# Patient Record
Sex: FEMALE | Race: WHITE | HISPANIC OR LATINO
[De-identification: names, ages, dates, MRNs, and addresses within clinical notes are randomized per-mention and may not be internally consistent; named-entity substitution may affect disease eponyms.]

---

## 2024-02-12 PROBLEM — Z00.00 ENCOUNTER FOR PREVENTIVE HEALTH EXAMINATION: Status: ACTIVE | Noted: 2024-02-12

## 2024-04-08 ENCOUNTER — APPOINTMENT (OUTPATIENT)
Dept: UROGYNECOLOGY | Facility: CLINIC | Age: 64
End: 2024-04-08
Payer: COMMERCIAL

## 2024-04-08 DIAGNOSIS — N81.6 RECTOCELE: ICD-10-CM

## 2024-04-08 DIAGNOSIS — N81.2 INCOMPLETE UTEROVAGINAL PROLAPSE: ICD-10-CM

## 2024-04-08 LAB
BILIRUB UR QL STRIP: NORMAL
CLARITY UR: CLEAR
COLLECTION METHOD: NORMAL
GLUCOSE UR-MCNC: NORMAL
HCG UR QL: 0.2 EU/DL
HGB UR QL STRIP.AUTO: NORMAL
KETONES UR-MCNC: NORMAL
LEUKOCYTE ESTERASE UR QL STRIP: NORMAL
NITRITE UR QL STRIP: NORMAL
PH UR STRIP: 6.5
PROT UR STRIP-MCNC: NORMAL
SP GR UR STRIP: <1.005

## 2024-04-08 PROCEDURE — 99204 OFFICE O/P NEW MOD 45 MIN: CPT | Mod: 25

## 2024-04-08 PROCEDURE — 51701 INSERT BLADDER CATHETER: CPT

## 2024-04-08 NOTE — PHYSICAL EXAM
[Chaperone Present] : A chaperone was present in the examining room during all aspects of the physical examination [Labia Majora] : were normal [Normal Appearance] : general appearance was normal [Atrophy] : atrophy [No Bleeding] : there was no active vaginal bleeding [Aa ____] : Aa [unfilled] [Ba ____] : Ba [unfilled] [C ____] : C [unfilled] [GH ____] : GH [unfilled] [PB ____] : PB [unfilled] [TVL ____] : TVL  [unfilled] [Ap ____] : Ap [unfilled] [Bp ____] : Bp [unfilled] [D ____] : D [unfilled] [Normal] : no abnormalities [Normal rectal exam] : was normal [FreeTextEntry1] : General: Well, appearing. Alert and orientated. No acute distress HEENT: Normocephalic, atraumatic and extraocular muscles appear to be intact Neck: Full range of motion, no obvious lymphadenopathy, deformities, or masses noted Respiratory: Speaking in full sentences comfortably, normal work of breathing and no cough during visit Musculoskeletal: active full range of motion in extremities Extremities: No upper extremity edema noted Skin: no obvious rash or skin lesions Neuro: Orientated X 3, speech is fluent, normal rate Psych: Normal mood and affect. [Tenderness] : ~T no ~M abdominal tenderness observed [Distended] : not distended

## 2024-04-08 NOTE — REASON FOR VISIT
[Questionnaire Received] : Patient questionnaire received [Intake Form Reviewed] : Patient intake form with past medical history, surgical history, family history and social history reviewed today [Pelvic Organ Prolapse] : pelvic organ prolapse [Urinary Incontinence] : urinary incontinence

## 2024-04-08 NOTE — HISTORY OF PRESENT ILLNESS
[Uterine Prolapse] : mild [Rectal Prolapse] : moderate [Unable To Restrain Bowel Movement] : mild [Urinary Tract Infection] : mild [Constipation Obstructed Defecation] : no [Stool Visible Blood] : no [Incomplete Emptying Of Stool] : no [] : no [de-identified] : sexually active [FreeTextEntry1] :  PMH: genital herpes PSH: h/o sling ~7 years ago, cholecystectomy  on osphina for atrophy

## 2024-04-08 NOTE — DISCUSSION/SUMMARY
[FreeTextEntry1] :  We reviewed management options for her prolapse including: observation, pelvic floor exercises with and without PT, pessary, and surgical management. We reviewed various surgical management options including vaginal, laparoscopic/robotic, abdominal procedures. We reviewed procedures involving hysterectomy as well as uterine sparing procedures. We also reviewed both mesh and non-mesh options. Written IUGA information on prolapse treatment options was also provided to her to review.   She would like to try a pessary and will RTO for pessary fitting. IUGA information on pessaries provided. We discussed her urinary symptoms. She will try a regular pessary first. If her ISABELLE worsens, will refit with pessary with continence support.

## 2024-04-29 ENCOUNTER — TRANSCRIPTION ENCOUNTER (OUTPATIENT)
Age: 64
End: 2024-04-29

## 2024-04-29 ENCOUNTER — APPOINTMENT (OUTPATIENT)
Dept: UROGYNECOLOGY | Facility: CLINIC | Age: 64
End: 2024-04-29
Payer: COMMERCIAL

## 2024-04-29 DIAGNOSIS — N39.46 MIXED INCONTINENCE: ICD-10-CM

## 2024-04-29 PROCEDURE — A4562: CPT

## 2024-04-29 PROCEDURE — 57160 INSERT PESSARY/OTHER DEVICE: CPT

## 2024-04-29 RX ORDER — ESTRADIOL 0.1 MG/G
0.1 CREAM VAGINAL
Qty: 1 | Refills: 5 | Status: ACTIVE | COMMUNITY
Start: 2024-04-29 | End: 1900-01-01

## 2024-04-29 NOTE — DISCUSSION/SUMMARY
[FreeTextEntry1] : RTO in 2 weeks or sooner as needed. - RS w/ knob #3 placed today -She was advised of possible worsening of urinary incontinence -She was advised to avoid constipation as pessary could fall out  -She was advised of possible increase in vaginal discharge and possibility of minimal vaginal bleeding -Precautions and instructions reviewed  #Vulvovaginal atrophy -We discussed risks, benefits and alternatives of vaginal estrogen cream  Rx for vaginal estrogen given; instructions reviewed.  #ISABELLE -She is interested in pelvic floor therapy and will be referred  -She will f/u for pessary teaching in 2 weeks

## 2024-04-29 NOTE — HISTORY OF PRESENT ILLNESS
[FreeTextEntry1] : Caterina is a 62y/o female with h/o mild ISABELLE and POP who presents for IPE.  She is sexually active and would like to perform self-care.

## 2024-04-29 NOTE — PHYSICAL EXAM
[Chaperone Present] : A chaperone was present in the examining room during all aspects of the physical examination [67662] : A chaperone was present during the pelvic exam. [No Acute Distress] : in no acute distress [Well developed] : well developed [Well Nourished] : ~L well nourished [Labia Majora] : were normal [Labia Minora] : were normal [Normal] : was normal [Normal Appearance] : general appearance was normal [Atrophy] : atrophy [FreeTextEntry2] : Rufus

## 2024-04-29 NOTE — PROCEDURE
[Good Fit] : fits well [Pessary Inserted] : inserted [None] : no bleeding [Medication Review] : Medicaiton Review: Patient verbalizes understanding of risks and benefits [Fluid Management] : Fluid Management: patient verbalizes understanding 6-10 cups per day [Bowel Management] : Bowel Management: patient verbalizes understanding of daily dietary fiber intake [Bladder Training] : Bladder Training: Patient given information with verbal understanding [Refit] : refit is not needed [Erosion] : no evidence of erosion [Erythema] : no erythema [Discharge] : no vaginal discharge [Infection] : no evidence of infection [FreeTextEntry1] : RS#5 was uncomfortable, RS#4 placed and was comfortable with valsalva and ambulation.  Pessary switched to RS w/ knob #3 due to ISABELLE which was comfortable

## 2024-05-31 ENCOUNTER — APPOINTMENT (OUTPATIENT)
Dept: UROGYNECOLOGY | Facility: CLINIC | Age: 64
End: 2024-05-31
Payer: COMMERCIAL

## 2024-05-31 DIAGNOSIS — N81.11 CYSTOCELE, MIDLINE: ICD-10-CM

## 2024-05-31 PROCEDURE — A4562: CPT

## 2024-05-31 PROCEDURE — 99213 OFFICE O/P EST LOW 20 MIN: CPT

## 2024-05-31 PROCEDURE — G2211 COMPLEX E/M VISIT ADD ON: CPT

## 2024-05-31 NOTE — PROCEDURE
[Good Fit] : fits well [Pessary Inserted] : inserted [None] : no bleeding [Medication Review] : Medicaiton Review: Patient verbalizes understanding of risks and benefits [Fluid Management] : Fluid Management: patient verbalizes understanding 6-10 cups per day [Bowel Management] : Bowel Management: patient verbalizes understanding of daily dietary fiber intake [Bladder Training] : Bladder Training: Patient given information with verbal understanding [Refit] : refit needed [Erosion] : no evidence of erosion [Erythema] : no erythema [Discharge] : no vaginal discharge [Infection] : no evidence of infection [FreeTextEntry1] : RS#3 [de-identified] : switched to RS#3 as she did not see noticeable benefit of knob pessary [FreeTextEntry8] : she demonstrated pessary self-care

## 2024-05-31 NOTE — PHYSICAL EXAM
[No Acute Distress] : in no acute distress [Well developed] : well developed [Well Nourished] : ~L well nourished [Labia Majora] : were normal [Labia Minora] : were normal [Normal] : was normal [Normal Appearance] : general appearance was normal [Atrophy] : atrophy [FreeTextEntry2] : Rufus

## 2024-05-31 NOTE — HISTORY OF PRESENT ILLNESS
[FreeTextEntry1] : Caterina is a 62y/o female with h/o mild ISABELLE and POP with recent placement of Ring with support #3.  She returns for pessary teaching.  She reports continued leakage of urine during walking or standing.  She denies leakage when she coughs, laughs or sneezes. She has not noticed a change in her ISABELLE with the knob pessary.  She denies pelvic pain, pressure or vaginal bleeding.

## 2024-05-31 NOTE — DISCUSSION/SUMMARY
[FreeTextEntry1] : #POP - RS#3 placed as she would like to perform self-care -Self-care demonstrated and she was advised to perform weekly -She is considering surgical management of her prolapse -She will undergo a UDS and f/u to [facility]   #Vulvovaginal atrophy -Continue vaginal estrogen cream  #ISABELLE -She is interested in pelvic floor therapy and will be referred

## 2024-07-25 ENCOUNTER — APPOINTMENT (OUTPATIENT)
Dept: UROGYNECOLOGY | Facility: CLINIC | Age: 64
End: 2024-07-25
Payer: COMMERCIAL

## 2024-07-25 ENCOUNTER — RESULT CHARGE (OUTPATIENT)
Age: 64
End: 2024-07-25

## 2024-07-25 VITALS — HEART RATE: 71 BPM | DIASTOLIC BLOOD PRESSURE: 72 MMHG | SYSTOLIC BLOOD PRESSURE: 135 MMHG

## 2024-07-25 DIAGNOSIS — N39.3 STRESS INCONTINENCE (FEMALE) (MALE): ICD-10-CM

## 2024-07-25 LAB
BILIRUB UR QL STRIP: NEGATIVE
CLARITY UR: CLEAR
COLLECTION METHOD: NORMAL
GLUCOSE UR-MCNC: NEGATIVE
HCG UR QL: 0.2 EU/DL
HGB UR QL STRIP.AUTO: NEGATIVE
KETONES UR-MCNC: NEGATIVE
LEUKOCYTE ESTERASE UR QL STRIP: NEGATIVE
NITRITE UR QL STRIP: NEGATIVE
PH UR STRIP: 7.5
PROT UR STRIP-MCNC: NEGATIVE
SP GR UR STRIP: 1.02

## 2024-07-25 PROCEDURE — 51797 INTRAABDOMINAL PRESSURE TEST: CPT | Mod: 26

## 2024-07-25 PROCEDURE — 51729 CYSTOMETROGRAM W/VP&UP: CPT | Mod: 26

## 2024-07-25 PROCEDURE — 81003 URINALYSIS AUTO W/O SCOPE: CPT | Mod: QW

## 2024-07-25 PROCEDURE — 51784 ANAL/URINARY MUSCLE STUDY: CPT | Mod: 26

## 2024-08-21 ENCOUNTER — APPOINTMENT (OUTPATIENT)
Dept: UROGYNECOLOGY | Facility: CLINIC | Age: 64
End: 2024-08-21
Payer: COMMERCIAL

## 2024-08-21 VITALS
BODY MASS INDEX: 25.8 KG/M2 | HEIGHT: 59 IN | WEIGHT: 128 LBS | DIASTOLIC BLOOD PRESSURE: 84 MMHG | SYSTOLIC BLOOD PRESSURE: 130 MMHG

## 2024-08-21 DIAGNOSIS — N81.2 INCOMPLETE UTEROVAGINAL PROLAPSE: ICD-10-CM

## 2024-08-21 DIAGNOSIS — N81.11 CYSTOCELE, MIDLINE: ICD-10-CM

## 2024-08-21 DIAGNOSIS — N39.46 MIXED INCONTINENCE: ICD-10-CM

## 2024-08-21 DIAGNOSIS — N81.6 RECTOCELE: ICD-10-CM

## 2024-08-21 PROCEDURE — 99214 OFFICE O/P EST MOD 30 MIN: CPT

## 2024-08-21 NOTE — DISCUSSION/SUMMARY
[FreeTextEntry1] : We reviewed management options for her prolapse including: observation, pelvic floor exercises with and without PT, pessary, and surgical management. We reviewed various surgical management options including vaginal, laparoscopic/robotic, abdominal procedures. We reviewed procedures involving hysterectomy as well as uterine sparing procedures. We also reviewed both mesh and non-mesh options. She desires surgery with a PHOENIX MUSTAFA. She also desires a concomitant sling for her ISABELLE. She will RTO for preop counseling.

## 2024-08-21 NOTE — HISTORY OF PRESENT ILLNESS
[Uterine Prolapse] : mild [Rectal Prolapse] : moderate [Unable To Restrain Bowel Movement] : mild [Urinary Tract Infection] : mild [Constipation Obstructed Defecation] : no [Stool Visible Blood] : no [Incomplete Emptying Of Stool] : no [] : no [de-identified] : sexually active [FreeTextEntry1] :  Caterina presents for follow up and discussion of urodynamic test results. She underwent urodynamics which revealed Stress urinary incontinence. We discussed that although her test was negative for detrusor overactivity, she may still have an overactive bladder. She has uterovaginal prolapse and desires surgery. She reports pessary is uncomfortable and is not controlling her ISABELLE well.   PMH: genital herpes PSH: h/o sling ~7 years ago, cholecystectomy  on osphina for atrophy

## 2024-08-28 ENCOUNTER — RESULT REVIEW (OUTPATIENT)
Age: 64
End: 2024-08-28

## 2024-08-28 ENCOUNTER — APPOINTMENT (OUTPATIENT)
Dept: ULTRASOUND IMAGING | Facility: CLINIC | Age: 64
End: 2024-08-28
Payer: COMMERCIAL

## 2024-08-28 PROCEDURE — 76830 TRANSVAGINAL US NON-OB: CPT

## 2024-08-28 PROCEDURE — 76856 US EXAM PELVIC COMPLETE: CPT

## 2024-08-30 ENCOUNTER — NON-APPOINTMENT (OUTPATIENT)
Age: 64
End: 2024-08-30

## 2024-10-11 ENCOUNTER — APPOINTMENT (OUTPATIENT)
Dept: UROGYNECOLOGY | Facility: CLINIC | Age: 64
End: 2024-10-11
Payer: COMMERCIAL

## 2024-10-11 VITALS
HEART RATE: 76 BPM | WEIGHT: 128 LBS | HEIGHT: 59 IN | BODY MASS INDEX: 25.8 KG/M2 | SYSTOLIC BLOOD PRESSURE: 130 MMHG | DIASTOLIC BLOOD PRESSURE: 80 MMHG

## 2024-10-11 DIAGNOSIS — N81.11 CYSTOCELE, MIDLINE: ICD-10-CM

## 2024-10-11 DIAGNOSIS — N39.3 STRESS INCONTINENCE (FEMALE) (MALE): ICD-10-CM

## 2024-10-11 DIAGNOSIS — N81.6 RECTOCELE: ICD-10-CM

## 2024-10-11 DIAGNOSIS — N81.2 INCOMPLETE UTEROVAGINAL PROLAPSE: ICD-10-CM

## 2024-10-11 PROCEDURE — 99214 OFFICE O/P EST MOD 30 MIN: CPT

## 2024-10-14 LAB — BACTERIA UR CULT: NORMAL

## 2024-10-18 ENCOUNTER — OUTPATIENT (OUTPATIENT)
Dept: OUTPATIENT SERVICES | Facility: HOSPITAL | Age: 64
LOS: 1 days | End: 2024-10-18
Payer: COMMERCIAL

## 2024-10-18 VITALS
WEIGHT: 130.07 LBS | SYSTOLIC BLOOD PRESSURE: 128 MMHG | TEMPERATURE: 98 F | HEART RATE: 70 BPM | RESPIRATION RATE: 16 BRPM | HEIGHT: 59 IN | OXYGEN SATURATION: 98 % | DIASTOLIC BLOOD PRESSURE: 77 MMHG

## 2024-10-18 DIAGNOSIS — N39.46 MIXED INCONTINENCE: ICD-10-CM

## 2024-10-18 DIAGNOSIS — N81.2 INCOMPLETE UTEROVAGINAL PROLAPSE: ICD-10-CM

## 2024-10-18 DIAGNOSIS — Z98.890 OTHER SPECIFIED POSTPROCEDURAL STATES: Chronic | ICD-10-CM

## 2024-10-18 DIAGNOSIS — N81.6 RECTOCELE: ICD-10-CM

## 2024-10-18 DIAGNOSIS — N81.11 CYSTOCELE, MIDLINE: ICD-10-CM

## 2024-10-18 DIAGNOSIS — Z01.818 ENCOUNTER FOR OTHER PREPROCEDURAL EXAMINATION: ICD-10-CM

## 2024-10-18 DIAGNOSIS — Z90.49 ACQUIRED ABSENCE OF OTHER SPECIFIED PARTS OF DIGESTIVE TRACT: Chronic | ICD-10-CM

## 2024-10-18 LAB
ANION GAP SERPL CALC-SCNC: 12 MMOL/L — SIGNIFICANT CHANGE UP (ref 5–17)
BLD GP AB SCN SERPL QL: NEGATIVE — SIGNIFICANT CHANGE UP
BUN SERPL-MCNC: 22 MG/DL — SIGNIFICANT CHANGE UP (ref 7–23)
CALCIUM SERPL-MCNC: 9.6 MG/DL — SIGNIFICANT CHANGE UP (ref 8.4–10.5)
CHLORIDE SERPL-SCNC: 105 MMOL/L — SIGNIFICANT CHANGE UP (ref 96–108)
CO2 SERPL-SCNC: 26 MMOL/L — SIGNIFICANT CHANGE UP (ref 22–31)
CREAT SERPL-MCNC: 0.54 MG/DL — SIGNIFICANT CHANGE UP (ref 0.5–1.3)
EGFR: 104 ML/MIN/1.73M2 — SIGNIFICANT CHANGE UP
GLUCOSE SERPL-MCNC: 112 MG/DL — HIGH (ref 70–99)
HCT VFR BLD CALC: 40.1 % — SIGNIFICANT CHANGE UP (ref 34.5–45)
HGB BLD-MCNC: 13.6 G/DL — SIGNIFICANT CHANGE UP (ref 11.5–15.5)
MCHC RBC-ENTMCNC: 31.9 PG — SIGNIFICANT CHANGE UP (ref 27–34)
MCHC RBC-ENTMCNC: 33.9 GM/DL — SIGNIFICANT CHANGE UP (ref 32–36)
MCV RBC AUTO: 93.9 FL — SIGNIFICANT CHANGE UP (ref 80–100)
NRBC # BLD: 0 /100 WBCS — SIGNIFICANT CHANGE UP (ref 0–0)
PLATELET # BLD AUTO: 203 K/UL — SIGNIFICANT CHANGE UP (ref 150–400)
POTASSIUM SERPL-MCNC: 4.1 MMOL/L — SIGNIFICANT CHANGE UP (ref 3.5–5.3)
POTASSIUM SERPL-SCNC: 4.1 MMOL/L — SIGNIFICANT CHANGE UP (ref 3.5–5.3)
RBC # BLD: 4.27 M/UL — SIGNIFICANT CHANGE UP (ref 3.8–5.2)
RBC # FLD: 12.7 % — SIGNIFICANT CHANGE UP (ref 10.3–14.5)
RH IG SCN BLD-IMP: POSITIVE — SIGNIFICANT CHANGE UP
SODIUM SERPL-SCNC: 143 MMOL/L — SIGNIFICANT CHANGE UP (ref 135–145)
WBC # BLD: 6.17 K/UL — SIGNIFICANT CHANGE UP (ref 3.8–10.5)
WBC # FLD AUTO: 6.17 K/UL — SIGNIFICANT CHANGE UP (ref 3.8–10.5)

## 2024-10-18 PROCEDURE — 86850 RBC ANTIBODY SCREEN: CPT

## 2024-10-18 PROCEDURE — 85027 COMPLETE CBC AUTOMATED: CPT

## 2024-10-18 PROCEDURE — 86901 BLOOD TYPING SEROLOGIC RH(D): CPT

## 2024-10-18 PROCEDURE — 83036 HEMOGLOBIN GLYCOSYLATED A1C: CPT

## 2024-10-18 PROCEDURE — 80048 BASIC METABOLIC PNL TOTAL CA: CPT

## 2024-10-18 PROCEDURE — 86900 BLOOD TYPING SEROLOGIC ABO: CPT

## 2024-10-18 PROCEDURE — G0463: CPT

## 2024-10-18 RX ORDER — LIDOCAINE HCL 60 MG/3 ML
0.2 SYRINGE (ML) INJECTION ONCE
Refills: 0 | Status: DISCONTINUED | OUTPATIENT
Start: 2024-11-08 | End: 2024-11-08

## 2024-10-18 RX ORDER — CEFAZOLIN SODIUM 1 G
2000 VIAL (EA) INJECTION ONCE
Refills: 0 | Status: COMPLETED | OUTPATIENT
Start: 2024-11-08 | End: 2024-11-08

## 2024-10-18 RX ORDER — SODIUM CHLORIDE 9 MG/ML
3 INJECTION, SOLUTION INTRAMUSCULAR; INTRAVENOUS; SUBCUTANEOUS EVERY 8 HOURS
Refills: 0 | Status: DISCONTINUED | OUTPATIENT
Start: 2024-11-08 | End: 2024-11-08

## 2024-10-18 RX ORDER — CHLORHEXIDINE GLUCONATE 40 MG/ML
1 SOLUTION TOPICAL ONCE
Refills: 0 | Status: DISCONTINUED | OUTPATIENT
Start: 2024-11-08 | End: 2024-11-08

## 2024-10-18 RX ORDER — VALACYCLOVIR 1000 MG/1
1 TABLET ORAL
Refills: 0 | DISCHARGE

## 2024-10-18 RX ORDER — CELECOXIB 100 MG
400 CAPSULE ORAL ONCE
Refills: 0 | Status: COMPLETED | OUTPATIENT
Start: 2024-11-08 | End: 2024-11-08

## 2024-10-18 RX ORDER — ACETAMINOPHEN 500 MG
1000 TABLET ORAL ONCE
Refills: 0 | Status: COMPLETED | OUTPATIENT
Start: 2024-11-08 | End: 2024-11-08

## 2024-10-18 RX ORDER — GABAPENTIN 300 MG/1
600 CAPSULE ORAL ONCE
Refills: 0 | Status: COMPLETED | OUTPATIENT
Start: 2024-11-08 | End: 2024-11-08

## 2024-10-18 NOTE — H&P PST ADULT - NSICDXPASTMEDICALHX_GEN_ALL_CORE_FT
PAST MEDICAL HISTORY:  2019 novel coronavirus disease (COVID-19)     Incomplete uterovaginal prolapse     OA (osteoarthritis)     Stress incontinence

## 2024-10-18 NOTE — H&P PST ADULT - NSANTHOSAYNRD_GEN_A_CORE
No. JUAN RAMON screening performed.  STOP BANG Legend: 0-2 = LOW Risk; 3-4 = INTERMEDIATE Risk; 5-8 = HIGH Risk

## 2024-10-18 NOTE — H&P PST ADULT - HISTORY OF PRESENT ILLNESS
63 y/o M  c/o stress inconstance urinary urgency and vaginal bulging for 5-6 month. She f/u with GYN and was diagnosed with incomplete uterovaginal prolapse. Today she presents to PST for scheduled Lap Robotic Supracervical Hysterectomy lap Robotic Sacral Colpopexy Midurethral Sling Cystoscopy on 24. Denies any palpitations, SOB, N/V, fever or chills.

## 2024-10-18 NOTE — H&P PST ADULT - NSICDXPASTSURGICALHX_GEN_ALL_CORE_FT
PAST SURGICAL HISTORY:  H/O midurethral sling procedure     History of laparoscopic cholecystectomy

## 2024-10-18 NOTE — H&P PST ADULT - ASSESSMENT
DASI: able to go up one flight of stairs or walk 1-2 blocks with out difficulty  Loose teeth: denies     CAPRINI SCORE    AGE RELATED RISK FACTORS                                                             [ ] Age 41-60 years                                            (1 Point)  [ x] Age: 61-74 years                                           (2 Points)                 [ ] Age= 75 years                                                (3 Points)             DISEASE RELATED RISK FACTORS                                                       [ ] Edema in the lower extremities                 (1 Point)                     [ ] Varicose veins                                               (1 Point)                                 [ ] BMI > 25 Kg/m2                                            (1 Point)                                  [ ] Serious infection (ie PNA)                            (1 Point)                     [ ] Lung disease ( COPD, Emphysema)            (1 Point)                                                                          [ ] Acute myocardial infarction                         (1 Point)                  [ ] Congestive heart failure (in the previous month)  (1 Point)         [ ] Inflammatory bowel disease                            (1 Point)                  [ ] Central venous access, PICC or Port               (2 points)       (within the last month)                                                                [ ] Stroke (in the previous month)                        (5 Points)    [ ] Previous or present malignancy                       (2 points)                                                                                                                                                         HEMATOLOGY RELATED FACTORS                                                         [ ] Prior episodes of VTE                                     (3 Points)                     [ ] Positive family history for VTE                      (3 Points)                  [ ] Prothrombin 72141 A                                     (3 Points)                     [ ] Factor V Leiden                                                (3 Points)                        [ ] Lupus anticoagulants                                      (3 Points)                                                           [ ] Anticardiolipin antibodies                              (3 Points)                                                       [ ] High homocysteine in the blood                   (3 Points)                                             [ ] Other congenital or acquired thrombophilia      (3 Points)                                                [ ] Heparin induced thrombocytopenia                  (3 Points)                                        MOBILITY RELATED FACTORS  [ ] Bed rest                                                         (1 Point)  [ ] Plaster cast                                                    (2 points)  [ ] Bed bound for more than 72 hours           (2 Points)    GENDER SPECIFIC FACTORS  [ ] Pregnancy or had a baby within the last month   (1 Point)  [ ] Post-partum < 6 weeks                                   (1 Point)  [ ] Hormonal therapy  or oral contraception   (1 Point)  [ ] History of pregnancy complications              (1 point)  [ ] Unexplained or recurrent              (1 Point)    OTHER RISK FACTORS                                           (1 Point)  [ ] BMI >40, smoking, diabetes requiring insulin, chemotherapy  blood transfusions and length of surgery over 2 hours    SURGERY RELATED RISK FACTORS  [ ]  Section within the last month     (1 Point)  [ ] Minor surgery                                                  (1 Point)  [ ] Arthroscopic surgery                                       (2 Points)  [x ] Planned major surgery lasting more            (2 Points)      than 45 minutes     [ ] Elective hip or knee joint replacement       (5 points)       surgery                                                TRAUMA RELATED RISK FACTORS  [ ] Fracture of the hip, pelvis, or leg                       (5 Points)  [ ] Spinal cord injury resulting in paralysis             (5 points)       (in the previous month)    [ ] Paralysis  (less than 1 month)                             (5 Points)  [ ] Multiple Trauma within 1 month                        (5 Points)    Total Score [   4     ]    Caprini Score 0-2: Low Risk, NO VTE prophylaxis required for most patients, encourage ambulation  Caprini Score 3-6: Moderate Risk , pharmacologic VTE prophylaxis is indicated for most patients (in the absence of contraindications)  Caprini Score Greater than or =7: High risk, pharmocologic VTE prophylaxis indicated for most patients (in the absence of contraindications)

## 2024-10-18 NOTE — H&P PST ADULT - PROBLEM SELECTOR PLAN 1
Lap Robotic Supracervical Hysterectomy lap Robotic Sacral Colpopexy Midurethral Sling Cystoscopy on 11/8/24  Pre-op education provided - all questions answered. Pt verbalized understanding

## 2024-10-19 LAB
A1C WITH ESTIMATED AVERAGE GLUCOSE RESULT: 5.8 % — HIGH (ref 4–5.6)
ESTIMATED AVERAGE GLUCOSE: 120 MG/DL — HIGH (ref 68–114)

## 2024-11-08 ENCOUNTER — OUTPATIENT (OUTPATIENT)
Dept: INPATIENT UNIT | Facility: HOSPITAL | Age: 64
LOS: 1 days | End: 2024-11-08
Payer: COMMERCIAL

## 2024-11-08 ENCOUNTER — APPOINTMENT (OUTPATIENT)
Dept: UROGYNECOLOGY | Facility: HOSPITAL | Age: 64
End: 2024-11-08
Payer: COMMERCIAL

## 2024-11-08 ENCOUNTER — RESULT REVIEW (OUTPATIENT)
Age: 64
End: 2024-11-08

## 2024-11-08 ENCOUNTER — TRANSCRIPTION ENCOUNTER (OUTPATIENT)
Age: 64
End: 2024-11-08

## 2024-11-08 VITALS
RESPIRATION RATE: 16 BRPM | TEMPERATURE: 98 F | WEIGHT: 130.07 LBS | HEIGHT: 59.02 IN | OXYGEN SATURATION: 96 % | DIASTOLIC BLOOD PRESSURE: 83 MMHG | SYSTOLIC BLOOD PRESSURE: 139 MMHG | HEART RATE: 77 BPM

## 2024-11-08 DIAGNOSIS — N39.46 MIXED INCONTINENCE: ICD-10-CM

## 2024-11-08 DIAGNOSIS — Z90.49 ACQUIRED ABSENCE OF OTHER SPECIFIED PARTS OF DIGESTIVE TRACT: Chronic | ICD-10-CM

## 2024-11-08 DIAGNOSIS — N81.11 CYSTOCELE, MIDLINE: ICD-10-CM

## 2024-11-08 DIAGNOSIS — Z98.890 OTHER SPECIFIED POSTPROCEDURAL STATES: Chronic | ICD-10-CM

## 2024-11-08 LAB
GLUCOSE BLDC GLUCOMTR-MCNC: 99 MG/DL — SIGNIFICANT CHANGE UP (ref 70–99)
HCT VFR BLD CALC: 39.1 % — SIGNIFICANT CHANGE UP (ref 34.5–45)
HGB BLD-MCNC: 13.6 G/DL — SIGNIFICANT CHANGE UP (ref 11.5–15.5)

## 2024-11-08 PROCEDURE — 57425 LAPAROSCOPY SURG COLPOPEXY: CPT

## 2024-11-08 PROCEDURE — 57288 REPAIR BLADDER DEFECT: CPT

## 2024-11-08 PROCEDURE — 88305 TISSUE EXAM BY PATHOLOGIST: CPT | Mod: 26

## 2024-11-08 PROCEDURE — 58542 LSH W/T/O UT 250 G OR LESS: CPT

## 2024-11-08 DEVICE — SYS SLING MID URET TRANSVAG: Type: IMPLANTABLE DEVICE | Status: FUNCTIONAL

## 2024-11-08 DEVICE — MESH UPSYLON Y: Type: IMPLANTABLE DEVICE | Status: FUNCTIONAL

## 2024-11-08 DEVICE — SURGIFLO MATRIX WITH THROMBIN KIT: Type: IMPLANTABLE DEVICE | Status: FUNCTIONAL

## 2024-11-08 RX ORDER — SIMETHICONE 80 MG/1
80 TABLET, CHEWABLE ORAL EVERY 6 HOURS
Refills: 0 | Status: DISCONTINUED | OUTPATIENT
Start: 2024-11-08 | End: 2024-11-09

## 2024-11-08 RX ORDER — ONDANSETRON HYDROCHLORIDE 2 MG/ML
4 INJECTION, SOLUTION INTRAMUSCULAR; INTRAVENOUS EVERY 6 HOURS
Refills: 0 | Status: DISCONTINUED | OUTPATIENT
Start: 2024-11-08 | End: 2024-11-09

## 2024-11-08 RX ORDER — POLYETHYLENE GLYCOL 3350 17 G/17G
17 POWDER, FOR SOLUTION ORAL DAILY
Refills: 0 | Status: DISCONTINUED | OUTPATIENT
Start: 2024-11-08 | End: 2024-11-09

## 2024-11-08 RX ORDER — OXYCODONE HYDROCHLORIDE 30 MG/1
5 TABLET ORAL EVERY 6 HOURS
Refills: 0 | Status: DISCONTINUED | OUTPATIENT
Start: 2024-11-08 | End: 2024-11-09

## 2024-11-08 RX ORDER — KETOROLAC TROMETHAMINE 30 MG/ML
30 INJECTION INTRAMUSCULAR; INTRAVENOUS EVERY 6 HOURS
Refills: 0 | Status: DISCONTINUED | OUTPATIENT
Start: 2024-11-08 | End: 2024-11-08

## 2024-11-08 RX ORDER — KETOROLAC TROMETHAMINE 30 MG/ML
15 INJECTION INTRAMUSCULAR; INTRAVENOUS EVERY 6 HOURS
Refills: 0 | Status: DISCONTINUED | OUTPATIENT
Start: 2024-11-08 | End: 2024-11-09

## 2024-11-08 RX ORDER — SENNA 187 MG
2 TABLET ORAL AT BEDTIME
Refills: 0 | Status: DISCONTINUED | OUTPATIENT
Start: 2024-11-08 | End: 2024-11-09

## 2024-11-08 RX ORDER — HYDROMORPHONE HCL/0.9% NACL/PF 6 MG/30 ML
0.5 PATIENT CONTROLLED ANALGESIA SYRINGE INTRAVENOUS
Refills: 0 | Status: DISCONTINUED | OUTPATIENT
Start: 2024-11-08 | End: 2024-11-08

## 2024-11-08 RX ORDER — ONDANSETRON HYDROCHLORIDE 2 MG/ML
4 INJECTION, SOLUTION INTRAMUSCULAR; INTRAVENOUS ONCE
Refills: 0 | Status: DISCONTINUED | OUTPATIENT
Start: 2024-11-08 | End: 2024-11-08

## 2024-11-08 RX ORDER — IBUPROFEN 200 MG
1 TABLET ORAL
Qty: 42 | Refills: 0
Start: 2024-11-08 | End: 2024-11-21

## 2024-11-08 RX ORDER — ACETAMINOPHEN 500 MG
1000 TABLET ORAL EVERY 6 HOURS
Refills: 0 | Status: COMPLETED | OUTPATIENT
Start: 2024-11-08 | End: 2024-11-09

## 2024-11-08 RX ADMIN — Medication 0.5 MILLIGRAM(S): at 13:05

## 2024-11-08 RX ADMIN — Medication 1000 MILLIGRAM(S): at 18:00

## 2024-11-08 RX ADMIN — SIMETHICONE 80 MILLIGRAM(S): 80 TABLET, CHEWABLE ORAL at 23:43

## 2024-11-08 RX ADMIN — KETOROLAC TROMETHAMINE 15 MILLIGRAM(S): 30 INJECTION INTRAMUSCULAR; INTRAVENOUS at 20:41

## 2024-11-08 RX ADMIN — Medication 2 TABLET(S): at 22:39

## 2024-11-08 RX ADMIN — SIMETHICONE 80 MILLIGRAM(S): 80 TABLET, CHEWABLE ORAL at 17:22

## 2024-11-08 RX ADMIN — SODIUM CHLORIDE 3 MILLILITER(S): 9 INJECTION, SOLUTION INTRAMUSCULAR; INTRAVENOUS; SUBCUTANEOUS at 06:45

## 2024-11-08 RX ADMIN — Medication 1000 MILLIGRAM(S): at 07:23

## 2024-11-08 RX ADMIN — KETOROLAC TROMETHAMINE 15 MILLIGRAM(S): 30 INJECTION INTRAMUSCULAR; INTRAVENOUS at 20:04

## 2024-11-08 RX ADMIN — Medication 400 MILLIGRAM(S): at 07:24

## 2024-11-08 RX ADMIN — Medication 0.5 MILLIGRAM(S): at 13:20

## 2024-11-08 RX ADMIN — GABAPENTIN 600 MILLIGRAM(S): 300 CAPSULE ORAL at 07:24

## 2024-11-08 RX ADMIN — Medication 400 MILLIGRAM(S): at 17:15

## 2024-11-08 RX ADMIN — Medication 400 MILLIGRAM(S): at 23:42

## 2024-11-08 NOTE — DISCHARGE NOTE PROVIDER - HOSPITAL COURSE
65 y/o s/p lsc RA KAI, BS, sacral colpoplexy, MUS, cysto.     Patient presented for scheduled procedure. She underwent uncomplicated  lsc RA KAI, BS, sacral colpoplexy, MUS, cysto. Please see operative note for full details. Patient was transferred to recovery room in stable condition.  EBL: 100. Hct:40.1    On POD #1, Carter catheter was discontinued and patient passed trial of void. Patient tolerated regular diet without issues.  On POD#1, pt was discharged in stable condition, ambulating, tolerating po and voiding spontaneously. Labs drawn post-operatively and on POD#1 were stable compared to pre-op.     Patient was instructed to follow up with Dr. Mosley in 2 weeks. 63 y/o s/p LSC Robot-Assisted Supracervical Hysterectomy, Bilateral Salpingectomy, sacral colpoplexy, MUS, cysto.     Patient presented for scheduled procedure. She underwent uncomplicated LSC Robot-Assisted Supracervical Hysterectomy, Bilateral Salpingectomy, sacral colpoplexy, MUS, cysto. Please see operative note for full details. Patient was transferred to recovery room in stable condition.  EBL: 100      Hct: 40.1->39.1->37.0    On POD #1, Carter catheter was discontinued and patient passed trial of void. Patient tolerated regular diet without issues. Patient was discharged in stable condition, ambulating, tolerating po and voiding spontaneously. Labs drawn post-operatively and on POD#1 were stable compared to pre-op.     Patient was instructed to follow up with Dr. Mosley in 2 weeks.

## 2024-11-08 NOTE — DISCHARGE NOTE PROVIDER - NSDCCPTREATMENT_GEN_ALL_CORE_FT
PRINCIPAL PROCEDURE  Procedure: Robot-assisted laparoscopic supracervical hysterectomy with cystoscopy  Findings and Treatment:       SECONDARY PROCEDURE  Procedure: Creation, midurethral sling, female  Findings and Treatment:     Procedure: Cystoscopy, female  Findings and Treatment:     Procedure: Robot-assisted sacrocolpopexy  Findings and Treatment:

## 2024-11-08 NOTE — DISCHARGE NOTE PROVIDER - NSDCFUADDINST_GEN_ALL_CORE_FT
Postoperative Instructions    Bowel regimen    To avoid constipation and straining, we suggest the following (simultaneously):  1. Colace (stool softener) 100mg, one pill three times a day (breakfast, lunch, dinner). If stool is too soft, decrease to twice a day (breakfast, dinner)  If still constipated, you can add: Miralax once at bedtime  All of these agents can be obtained over the counter at the pharmacy.      Pain control.    For pain control, take the followin.  Motrin 800mg three times a day, take with food. Prescription sent to pharmacy  2.  Add Tylenol as needed if still have pain despite Motrin  Motrin and Tylenol can be obtained over the counter.  3. Oxycodone 5 mg tablet every 6 hours as needed for severe pain. Prescription sent to pharmacy    Postoperative urinary catheter    If you failed your voiding trial in the hospital and are sent home with a catheter, please call the office ([phone]) so you can come in to have a repeat voiding trial/catheter removal in a few days.      Postoperative restrictions    Nothing in the vagina (tampons, sexual intercourse), No tub baths, pools or hot tubs for 6 weeks (showers are ok!)  No lifting anything heavier than 10 lbs, no strenuous exercise for 2 weeks after surgery. Do not pull or cut any stitches that you see around your incision.      Vaginal bleeding    Spotting and intermittent passage of blood clots per vagina is normal in first few weeks after surgery. If you are soaking 1 pad per hour, that is not normal and you should notify my office and seek medical attention right away.      Vaginal discharge    Vaginal discharge (all colors) is normal after vaginal surgery. If you’ve had vaginal surgery, you have sutures in your vagina which take 3 months to fully absorb. You may have vaginal discharge during this time. This is normal.        Postoperative Instructions    Bowel regimen    To avoid constipation and straining, we suggest the following (simultaneously):  1. Colace (stool softener) 100mg, one pill three times a day (breakfast, lunch, dinner). If stool is too soft, decrease to twice a day (breakfast, dinner)  If still constipated, you can add: Miralax once at bedtime  All of these agents can be obtained over the counter at the pharmacy.      Pain control.    For pain control, take the followin.  Motrin 800mg three times a day, take with food. Prescription sent to pharmacy  2.  Add Tylenol as needed if still have pain despite Motrin  Motrin and Tylenol can be obtained over the counter.  3. Oxycodone 5 mg tablet every 6 hours as needed for severe pain. Prescription sent to pharmacy    Postoperative urinary catheter      Postoperative restrictions    Nothing in the vagina (tampons, sexual intercourse), No tub baths, pools or hot tubs for 6 weeks (showers are ok!)  No lifting anything heavier than 10 lbs, no strenuous exercise for 2 weeks after surgery. Do not pull or cut any stitches that you see around your incision.      Vaginal bleeding    Spotting and intermittent passage of blood clots per vagina is normal in first few weeks after surgery. If you are soaking 1 pad per hour, that is not normal and you should notify my office and seek medical attention right away.      Vaginal discharge    Vaginal discharge (all colors) is normal after vaginal surgery. If you’ve had vaginal surgery, you have sutures in your vagina which take 3 months to fully absorb. You may have vaginal discharge during this time. This is normal.

## 2024-11-08 NOTE — BRIEF OPERATIVE NOTE - NSICDXBRIEFPREOP_GEN_ALL_CORE_FT
PRE-OP DIAGNOSIS:  Uterovaginal prolapse 08-Nov-2024 12:29:06  Rayna Colón  Stress incontinence 08-Nov-2024 12:29:13  Rayna Colón

## 2024-11-08 NOTE — DISCHARGE NOTE PROVIDER - CARE PROVIDER_API CALL
Anabelle Mosley  Urogyn and Reconst Pelvic Surg  [address], [address]  [place] [address]  Phone: [phone]  Fax: [phone]  Scheduled Appointment: 11/27/2024

## 2024-11-08 NOTE — BRIEF OPERATIVE NOTE - NSICDXBRIEFPROCEDURE_GEN_ALL_CORE_FT
PROCEDURES:  Robot-assisted laparoscopic supracervical hysterectomy with cystoscopy 08-Nov-2024 12:27:10  Rayna Colón  Bilateral salpingectomy 08-Nov-2024 12:27:23  Rayna Colón  Robot-assisted sacrocolpopexy 08-Nov-2024 12:28:18  Rayna Colón  Creation, midurethral sling, female 08-Nov-2024 12:28:28  Rayna Colón  Cystoscopy, female 08-Nov-2024 12:28:52  Rayna Colón

## 2024-11-08 NOTE — CHART NOTE - NSCHARTNOTEFT_GEN_A_CORE
PA POST-OP CHECK    No Known Allergies    S: Pt awake and alert resting comfortably in bed.  Pain controlled. Pt denies N/V, SOB, CP, palpitations.    O:   T(C): 36.7 (11-08-24 @ 16:00), Max: 36.7 (11-08-24 @ 16:00)  HR: 101 (11-08-24 @ 16:00) (75 - 101)  BP: 121/57 (11-08-24 @ 16:00) (108/53 - 122/58)  RR: 16 (11-08-24 @ 16:00) (16 - 16)  SpO2: 95% (11-08-24 @ 16:00) (92% - 100%)    I&O's Summary    08 Nov 2024 07:01  -  08 Nov 2024 16:30  --------------------------------------------------------  IN: 200 mL / OUT: 750 mL / NET: -550 mL                          13.6   x     )-----------( x        ( 08 Nov 2024 15:51 )             39.1     Complete Blood Count (10.18.24 @ 18:59)      WBC Count: 6.17 K/uL    RBC Count: 4.27 M/uL    Hemoglobin: 13.6 g/dL    Hematocrit: 40.1 %     Platelet Count - Automated: 203 K/uL    Heart: S1S2, RRR  Lungs: CTA B/L  Abd: soft, appropriately tender, occassional BS x 4 quadrants  Port Sites/Inc: Clean/dry/intact  Pelvic:  (-) bleeding  Ext: PAS in place, Neg Homans B/L    A/P: 64y Female S/P RA KAI, BS, MUS, Sacral Colpopexy, Cystoscopy in Stable condition  with PMHx of   PAST MEDICAL & SURGICAL HISTORY:  Incomplete uterovaginal prolapse  Stress incontinence  2019 novel coronavirus disease (COVID-19)  OA (osteoarthritis)  History of laparoscopic cholecystectomy  H/O midurethral sling procedure    -Continue post-op care  -Analgesia in PACU & prn  -OOB with assistance x 2, then Ad Georgiana  -Meds:   acetaminophen   IVPB .. 1000 milliGRAM(s) IV Intermittent every 6 hours  HYDROmorphone  Injectable 0.5 milliGRAM(s) IV Push every 10 minutes PRN  ketorolac   Injectable 15 milliGRAM(s) IV Push every 6 hours  ondansetron Injectable 4 milliGRAM(s) IV Push once PRN  ondansetron Injectable 4 milliGRAM(s) IV Push every 6 hours PRN  oxyCODONE    IR 5 milliGRAM(s) Oral every 6 hours PRN  senna 2 Tablet(s) Oral at bedtime  simethicone 80 milliGRAM(s) Chew every 6 hours    -IVFluids- LR   -Diet-  Advance as Tolerated to Reg  - Carter to gravity  with TOV in a.m.  -PAS   -Analgesia as above    LATRICE Alonso

## 2024-11-08 NOTE — DISCHARGE NOTE PROVIDER - NSDCFUSCHEDAPPT_GEN_ALL_CORE_FT
[facility] Physician [facility]  UROGYN [address]  Scheduled Appointment: 11/27/2024

## 2024-11-08 NOTE — BRIEF OPERATIVE NOTE - NSICDXBRIEFPOSTOP_GEN_ALL_CORE_FT
POST-OP DIAGNOSIS:  Uterovaginal prolapse 08-Nov-2024 12:29:21  Rayna Colón  Stress incontinence 08-Nov-2024 12:29:27  Rayna Colón

## 2024-11-08 NOTE — DISCHARGE NOTE PROVIDER - NSDCMRMEDTOKEN_GEN_ALL_CORE_FT
acetaminophen 325 mg oral tablet: 3 tab(s) orally every 6 hours as needed for  moderate pain  ibuprofen 800 mg oral tablet: 1 tab(s) orally every 8 hours  Magnesium 1 tab po daily:   Valtrex 500 mg oral tablet: 1 tab(s) orally once a day   acetaminophen 325 mg oral tablet: 3 tab(s) orally every 6 hours as needed for  moderate pain  ibuprofen 800 mg oral tablet: 1 tab(s) orally every 8 hours  Magnesium 1 tab po daily:   oxyCODONE 5 mg oral tablet: 1 tab(s) orally every 6 hours as needed for  severe pain MDD: 4 tabs  Valtrex 500 mg oral tablet: 1 tab(s) orally once a day

## 2024-11-09 ENCOUNTER — TRANSCRIPTION ENCOUNTER (OUTPATIENT)
Age: 64
End: 2024-11-09

## 2024-11-09 VITALS
SYSTOLIC BLOOD PRESSURE: 122 MMHG | TEMPERATURE: 98 F | RESPIRATION RATE: 18 BRPM | HEART RATE: 74 BPM | OXYGEN SATURATION: 96 % | DIASTOLIC BLOOD PRESSURE: 73 MMHG

## 2024-11-09 LAB
HCT VFR BLD CALC: 37 % — SIGNIFICANT CHANGE UP (ref 34.5–45)
HGB BLD-MCNC: 12.8 G/DL — SIGNIFICANT CHANGE UP (ref 11.5–15.5)
MCHC RBC-ENTMCNC: 32.4 PG — SIGNIFICANT CHANGE UP (ref 27–34)
MCHC RBC-ENTMCNC: 34.6 G/DL — SIGNIFICANT CHANGE UP (ref 32–36)
MCV RBC AUTO: 93.7 FL — SIGNIFICANT CHANGE UP (ref 80–100)
NRBC # BLD: 0 /100 WBCS — SIGNIFICANT CHANGE UP (ref 0–0)
PLATELET # BLD AUTO: 102 K/UL — LOW (ref 150–400)
RBC # BLD: 3.95 M/UL — SIGNIFICANT CHANGE UP (ref 3.8–5.2)
RBC # FLD: 12.4 % — SIGNIFICANT CHANGE UP (ref 10.3–14.5)
WBC # BLD: 9.42 K/UL — SIGNIFICANT CHANGE UP (ref 3.8–10.5)
WBC # FLD AUTO: 9.42 K/UL — SIGNIFICANT CHANGE UP (ref 3.8–10.5)

## 2024-11-09 PROCEDURE — S2900: CPT

## 2024-11-09 PROCEDURE — 88305 TISSUE EXAM BY PATHOLOGIST: CPT

## 2024-11-09 PROCEDURE — 82962 GLUCOSE BLOOD TEST: CPT

## 2024-11-09 PROCEDURE — 85018 HEMOGLOBIN: CPT

## 2024-11-09 PROCEDURE — 85027 COMPLETE CBC AUTOMATED: CPT

## 2024-11-09 PROCEDURE — 58542 LSH W/T/O UT 250 G OR LESS: CPT

## 2024-11-09 PROCEDURE — C9399: CPT

## 2024-11-09 PROCEDURE — 57283 COLPOPEXY INTRAPERITONEAL: CPT

## 2024-11-09 PROCEDURE — C1771: CPT

## 2024-11-09 PROCEDURE — C1781: CPT

## 2024-11-09 PROCEDURE — 86900 BLOOD TYPING SEROLOGIC ABO: CPT

## 2024-11-09 PROCEDURE — 57288 REPAIR BLADDER DEFECT: CPT

## 2024-11-09 PROCEDURE — 86901 BLOOD TYPING SEROLOGIC RH(D): CPT

## 2024-11-09 PROCEDURE — 86850 RBC ANTIBODY SCREEN: CPT

## 2024-11-09 PROCEDURE — 85014 HEMATOCRIT: CPT

## 2024-11-09 PROCEDURE — C1889: CPT

## 2024-11-09 RX ORDER — OXYCODONE HYDROCHLORIDE 30 MG/1
1 TABLET ORAL
Qty: 5 | Refills: 0
Start: 2024-11-09

## 2024-11-09 RX ORDER — NAPROXEN 250 MG/1
500 TABLET ORAL ONCE
Refills: 0 | Status: COMPLETED | OUTPATIENT
Start: 2024-11-09 | End: 2024-11-09

## 2024-11-09 RX ADMIN — NAPROXEN 500 MILLIGRAM(S): 250 TABLET ORAL at 12:54

## 2024-11-09 RX ADMIN — KETOROLAC TROMETHAMINE 15 MILLIGRAM(S): 30 INJECTION INTRAMUSCULAR; INTRAVENOUS at 02:35

## 2024-11-09 RX ADMIN — KETOROLAC TROMETHAMINE 15 MILLIGRAM(S): 30 INJECTION INTRAMUSCULAR; INTRAVENOUS at 02:04

## 2024-11-09 RX ADMIN — Medication 1000 MILLIGRAM(S): at 00:12

## 2024-11-09 RX ADMIN — SIMETHICONE 80 MILLIGRAM(S): 80 TABLET, CHEWABLE ORAL at 05:35

## 2024-11-09 RX ADMIN — Medication 400 MILLIGRAM(S): at 05:35

## 2024-11-09 RX ADMIN — Medication 1000 MILLIGRAM(S): at 06:10

## 2024-11-09 RX ADMIN — SIMETHICONE 80 MILLIGRAM(S): 80 TABLET, CHEWABLE ORAL at 12:54

## 2024-11-09 NOTE — DISCHARGE NOTE NURSING/CASE MANAGEMENT/SOCIAL WORK - NSDCPEFALRISK_GEN_ALL_CORE
For information on Fall & Injury Prevention, visit: [email or web address] OR  [email or web address] OR  https://www.cdc.gov/steadi/patient.html

## 2024-11-09 NOTE — PROGRESS NOTE ADULT - SUBJECTIVE AND OBJECTIVE BOX
Urogyn Progress Note     SUBJECTIVE:   Pt seen and examined at bedside. No events overnight. Pain well controlled. Patient tried standing overnight but felt weak. Not yet passing flatus. Tolerating regular diet. Pt denies fever, chills, chest pain, SOB, nausea, vomiting.     PHYSICAL EXAM:   T(F): 98.1 (11-09-24 @ 05:04), Max: 98.4 (11-09-24 @ 00:39)  HR: 68 (11-09-24 @ 05:04) (68 - 102)  BP: 104/65 (11-09-24 @ 05:04) (99/62 - 139/83)  RR: 18 (11-09-24 @ 05:04) (16 - 18)  SpO2: 96% (11-09-24 @ 05:04) (92% - 100%)  Wt(kg): --    Constitutional: NAD, A+O x3  CV: clinically well perfused  Lungs: unlabored breathing  Abdomen: Soft, nondistended, no guarding or rebound tenderness.  Incision: 5 LSC incision and 2 suprapubic incisions CDI with dermabond.   : Minimal bleeding on pad  Extremities: No lower extremity edema or calf tenderness bilaterally      LABS:  pending      CAPILLARY BLOOD GLUCOSE        I&O's Summary    08 Nov 2024 07:01  -  09 Nov 2024 06:29  --------------------------------------------------------  IN: 200 mL / OUT: 2950 mL / NET: -2750 mL        MEDICATIONS  (STANDING):  acetaminophen   IVPB .. 1000 milliGRAM(s) IV Intermittent every 6 hours  ketorolac   Injectable 15 milliGRAM(s) IV Push every 6 hours  lactated ringers. 1000 milliLiter(s) (75 mL/Hr) IV Continuous <Continuous>  polyethylene glycol 3350 17 Gram(s) Oral daily  senna 2 Tablet(s) Oral at bedtime  simethicone 80 milliGRAM(s) Chew every 6 hours    MEDICATIONS  (PRN):  ondansetron Injectable 4 milliGRAM(s) IV Push every 6 hours PRN Nausea and/or Vomiting  oxyCODONE    IR 5 milliGRAM(s) Oral every 6 hours PRN Severe Pain (7 - 10)   Urogyn Progress Note     SUBJECTIVE:   Pt seen and examined at bedside. No events overnight. Pain well controlled. Tolerating regular diet. Pt denies fever, chills, chest pain, SOB, nausea, vomiting.     PHYSICAL EXAM:   T(F): 98.1 (11-09-24 @ 05:04), Max: 98.4 (11-09-24 @ 00:39)  HR: 68 (11-09-24 @ 05:04) (68 - 102)  BP: 104/65 (11-09-24 @ 05:04) (99/62 - 139/83)  RR: 18 (11-09-24 @ 05:04) (16 - 18)  SpO2: 96% (11-09-24 @ 05:04) (92% - 100%)  Wt(kg): --    Constitutional: NAD, A+O x3  CV: clinically well perfused  Lungs: unlabored breathing  Abdomen: Soft, nondistended, no guarding or rebound tenderness.  Incision: 5 LSC incision and 2 suprapubic incisions CDI with dermabond.   : Minimal bleeding on pad  Extremities: No lower extremity edema or calf tenderness bilaterally      LABS:  pending      CAPILLARY BLOOD GLUCOSE        I&O's Summary    08 Nov 2024 07:01  -  09 Nov 2024 06:29  --------------------------------------------------------  IN: 200 mL / OUT: 2950 mL / NET: -2750 mL        MEDICATIONS  (STANDING):  acetaminophen   IVPB .. 1000 milliGRAM(s) IV Intermittent every 6 hours  ketorolac   Injectable 15 milliGRAM(s) IV Push every 6 hours  lactated ringers. 1000 milliLiter(s) (75 mL/Hr) IV Continuous <Continuous>  polyethylene glycol 3350 17 Gram(s) Oral daily  senna 2 Tablet(s) Oral at bedtime  simethicone 80 milliGRAM(s) Chew every 6 hours    MEDICATIONS  (PRN):  ondansetron Injectable 4 milliGRAM(s) IV Push every 6 hours PRN Nausea and/or Vomiting  oxyCODONE    IR 5 milliGRAM(s) Oral every 6 hours PRN Severe Pain (7 - 10)

## 2024-11-09 NOTE — CHART NOTE - NSCHARTNOTEFT_GEN_A_CORE
Urogynecology Chart Note - TOV      Pt eval at bedside for trial of void.  Pain well controlled.      Bladder fully drained through mayo catheter.  Retrograde filled with 300cc sterile water then clamped.    Mayo removed. Patient assisted to restroom.  Given necessary time to void (20 minutes).    Patient voided 300cc of yellow urine, passing TOV.    To be discharged home without mayo catheter.      SLIME Ricci PGY2  d/w Dr. Jacobo

## 2024-11-09 NOTE — DISCHARGE NOTE NURSING/CASE MANAGEMENT/SOCIAL WORK - PATIENT PORTAL LINK FT
You can access the FollowMyHealth Patient Portal offered by [facility] by registering at the following website: [email or web address]. By joining [employer]’s FollowMyHealth portal, you will also be able to view your health information using other applications (apps) compatible with our system.

## 2024-11-09 NOTE — DISCHARGE NOTE NURSING/CASE MANAGEMENT/SOCIAL WORK - FINANCIAL ASSISTANCE
[facility] provides services at a reduced cost to those who are determined to be eligible through [facility]’s financial assistance program. Information regarding [facility]’s financial assistance program can be found by going to [email or web address] or by calling [phone].

## 2024-11-09 NOTE — PROGRESS NOTE ADULT - ASSESSMENT
Assessment/Plan: 64y female POD#1 s/p Robot-Assisted Supracervical Hysterectomy, Bilateral Salpingectomy, SCP, MUS, cysto recovering well post-operatively.    Neuro: Continue IV Tylenol, Toradol, and oxy PRN for pain control  CV: Hemodynamically stable  - f/u AM CBC  Pulm: Saturating well on room air. Encourage ambulation.   GI: Continue regular diet.   : Carter in place. Adequate UOP. Plan for AM TOV  Heme: Continue Venodynes for DVT ppx. Increase OOB.    FEN: LR@75  ID: Afebrile  Endo: No active issues  Dispo: Continue inpatient management. Discharge planning today after TOV    Shruti Ricci, PGY2   Assessment/Plan: 64y female POD#1 s/p Robot-Assisted Supracervical Hysterectomy, Bilateral Salpingectomy, SCP, MUS, cysto recovering well post-operatively.    Neuro: Continue IV Tylenol, Toradol, and oxy PRN for pain control  CV: Hemodynamically stable  - f/u AM CBC  Pulm: Saturating well on room air. Encourage ambulation.   GI: Continue regular diet.   : Mayo in place. Adequate UOP. Plan for AM TOV  Heme: Continue Venodynes for DVT ppx. Increase OOB.    FEN: LR@75  ID: Afebrile  Endo: No active issues  Dispo: Continue inpatient management. Discharge planning today after TOV    Shruti Ricci, PGY2    --------------  Urogyn Fellow Addendum    Pt seen and examined.  Pain well controlled.  Tolerating PO intake without n/v.  Passing flatus.    T(C): 36.7 (11-09-24 @ 05:04), Max: 36.9 (11-09-24 @ 00:39)  HR: 68 (11-09-24 @ 05:04) (68 - 102)  BP: 104/65 (11-09-24 @ 05:04) (99/62 - 122/58)  RR: 18 (11-09-24 @ 05:04) (16 - 18)  SpO2: 96% (11-09-24 @ 05:04) (92% - 100%)    I&O's Summary    08 Nov 2024 07:01  -  09 Nov 2024 07:00  --------------------------------------------------------  IN: 200 mL / OUT: 2950 mL / NET: -2750 mL    Physical Exam  Gen: NAD  HEENT: NCAT, sclera anicteric  Resp: non-labored  Abd: soft, non-distended, non-tender; incisions clean, dry, intact  : mayo draining clear urine; pad with <5 cc blood                          12.8   9.42  )-----------( 102      ( 09 Nov 2024 06:29 )             37.0         64F POD#1 s/p RA KAI, SCP, BS, RP midurethral sling, cystoscopy, doing well.   -continue pain control  -regular diet  -dc IV fluids  -oob/ambulation  -dvt prophylaxis  -written postop instruction sheet provided to and discussed with patient  -void trial this am  -dispo: home    Sachi Jacobo MD  Urogynecology Fellow, PGY-8

## 2024-11-11 ENCOUNTER — NON-APPOINTMENT (OUTPATIENT)
Age: 64
End: 2024-11-11

## 2024-11-11 RX ORDER — ACETAMINOPHEN 500 MG
3 TABLET ORAL
Refills: 0 | DISCHARGE

## 2024-11-12 ENCOUNTER — TRANSCRIPTION ENCOUNTER (OUTPATIENT)
Age: 64
End: 2024-11-12

## 2024-11-14 LAB — SURGICAL PATHOLOGY STUDY: SIGNIFICANT CHANGE UP

## 2024-11-26 PROBLEM — Z98.890 POSTOPERATIVE STATE: Status: ACTIVE | Noted: 2024-11-26

## 2024-11-27 ENCOUNTER — APPOINTMENT (OUTPATIENT)
Dept: UROGYNECOLOGY | Facility: CLINIC | Age: 64
End: 2024-11-27

## 2024-11-27 VITALS
HEART RATE: 65 BPM | HEIGHT: 55 IN | WEIGHT: 128 LBS | BODY MASS INDEX: 29.62 KG/M2 | SYSTOLIC BLOOD PRESSURE: 124 MMHG | DIASTOLIC BLOOD PRESSURE: 76 MMHG

## 2024-11-27 DIAGNOSIS — Z98.890 OTHER SPECIFIED POSTPROCEDURAL STATES: ICD-10-CM

## 2024-11-27 PROBLEM — N81.2 INCOMPLETE UTEROVAGINAL PROLAPSE: Chronic | Status: ACTIVE | Noted: 2024-10-18

## 2024-11-27 PROBLEM — U07.1 COVID-19: Chronic | Status: ACTIVE | Noted: 2024-10-18

## 2024-11-27 PROBLEM — M19.90 UNSPECIFIED OSTEOARTHRITIS, UNSPECIFIED SITE: Chronic | Status: ACTIVE | Noted: 2024-10-18

## 2024-11-27 PROBLEM — N39.3 STRESS INCONTINENCE (FEMALE) (MALE): Chronic | Status: ACTIVE | Noted: 2024-10-18

## 2024-11-27 PROCEDURE — 99024 POSTOP FOLLOW-UP VISIT: CPT

## 2024-11-27 RX ORDER — IBUPROFEN 800 MG/1
800 TABLET, FILM COATED ORAL 4 TIMES DAILY
Qty: 120 | Refills: 0 | Status: ACTIVE | COMMUNITY
Start: 2024-11-27 | End: 1900-01-01

## 2024-11-29 ENCOUNTER — NON-APPOINTMENT (OUTPATIENT)
Age: 64
End: 2024-11-29

## 2024-11-29 LAB — BACTERIA UR CULT: NORMAL

## 2024-12-12 ENCOUNTER — NON-APPOINTMENT (OUTPATIENT)
Age: 64
End: 2024-12-12

## 2024-12-17 ENCOUNTER — APPOINTMENT (OUTPATIENT)
Dept: UROGYNECOLOGY | Facility: CLINIC | Age: 64
End: 2024-12-17
Payer: COMMERCIAL

## 2024-12-17 VITALS — SYSTOLIC BLOOD PRESSURE: 146 MMHG | HEART RATE: 70 BPM | DIASTOLIC BLOOD PRESSURE: 79 MMHG

## 2024-12-17 DIAGNOSIS — Z98.890 OTHER SPECIFIED POSTPROCEDURAL STATES: ICD-10-CM

## 2024-12-17 PROCEDURE — 99024 POSTOP FOLLOW-UP VISIT: CPT

## 2025-03-19 ENCOUNTER — APPOINTMENT (OUTPATIENT)
Dept: UROGYNECOLOGY | Facility: CLINIC | Age: 65
End: 2025-03-19
Payer: COMMERCIAL

## 2025-03-19 VITALS — HEART RATE: 68 BPM | SYSTOLIC BLOOD PRESSURE: 123 MMHG | DIASTOLIC BLOOD PRESSURE: 84 MMHG

## 2025-03-19 DIAGNOSIS — N95.2 POSTMENOPAUSAL ATROPHIC VAGINITIS: ICD-10-CM

## 2025-03-19 PROCEDURE — 99213 OFFICE O/P EST LOW 20 MIN: CPT

## (undated) DEVICE — TUBING TUR 2 PRONG

## (undated) DEVICE — GOWN TRIMAX LG

## (undated) DEVICE — TROCAR SURGIQUEST AIRSEAL 8MMX100MM

## (undated) DEVICE — FOLEY TRAY 16FR LF URINE METER SURESTEP

## (undated) DEVICE — LONE STAR RETRACTOR RING 32.5CM X 18.3CM DISP

## (undated) DEVICE — BLADE SCALPEL SAFETYLOCK #11

## (undated) DEVICE — NDL COUNTER FOAM AND MAGNET 40-70

## (undated) DEVICE — SUT GORETEX CV-2 (0) 36" PH-24

## (undated) DEVICE — XI TIP COVER

## (undated) DEVICE — DRSG DERMABOND 0.7ML

## (undated) DEVICE — XI ARM FORCEP MARYLAND BIPOLAR

## (undated) DEVICE — WARMING BLANKET UPPER ADULT

## (undated) DEVICE — POSITIONER PINK PAD PIGAZZI SYSTEM

## (undated) DEVICE — APPLICATOR ENDOSCOPIC FOR SUGIFLO

## (undated) DEVICE — MARKING PEN W RULER

## (undated) DEVICE — SUT VLOC 180 2-0 6" GS-22 GREEN

## (undated) DEVICE — INSUFFLATION NDL COVIDIEN STEP 14G FOR STEP/VERSASTEP

## (undated) DEVICE — SUT POLYSORB 0 30" GS-21 UNDYED

## (undated) DEVICE — ENDOCATCH 10MM SPECIMEN POUCH

## (undated) DEVICE — FOLEY CATH 2-WAY 18FR 5CC LATEX HYDROGEL

## (undated) DEVICE — SUT BIOSYN 4-0 18" P-12

## (undated) DEVICE — PREP CHLOROHEXIDINE 4% 118CC KIT

## (undated) DEVICE — SUT CLIP LAPRA-TY ABSORBABLE SIZE 0.118 TO 0.12" VIOLET

## (undated) DEVICE — TUBING STRYKEFLOW II SUCTION / IRRIGATOR

## (undated) DEVICE — XI ARM FORCEP TENACULUM

## (undated) DEVICE — XI OBTURATOR OPTICAL BLADELESS 8MM

## (undated) DEVICE — DRAPE MAYO STAND 30"

## (undated) DEVICE — XI DRAPE COLUMN

## (undated) DEVICE — SUT POLYSORB 3-0 30" V-20 UNDYED

## (undated) DEVICE — LAP PAD 4 X 18"

## (undated) DEVICE — DRAPE 3/4 SHEET W REINFORCEMENT 56X77"

## (undated) DEVICE — NDL HYPO SAFE 22G X 1.5" (BLACK)

## (undated) DEVICE — LUBRICANT INST ELECTROLUBE Z SOLUTION

## (undated) DEVICE — SPECIMEN CONTAINER 100ML

## (undated) DEVICE — XI DRAPE ARM

## (undated) DEVICE — SOL INJ NS 0.9% 1000ML

## (undated) DEVICE — STAPLER SKIN VISI-STAT 35 WIDE

## (undated) DEVICE — DRAPE GYN W LEGGINGS 3X125"

## (undated) DEVICE — VENODYNE/SCD SLEEVE CALF MEDIUM

## (undated) DEVICE — MEDICATION LABELS W MARKER

## (undated) DEVICE — TUBING AIRSEAL TRI-LUMEN FILTERED

## (undated) DEVICE — PREP CHLORAPREP HI-LITE ORANGE 26ML

## (undated) DEVICE — XI ARM FORCEP PROGRASP 8MM

## (undated) DEVICE — SYR LUER LOK 20CC

## (undated) DEVICE — XI ARM SCISSOR MONO CURVED

## (undated) DEVICE — XI SEAL UNIVERSIAL 5-12MM

## (undated) DEVICE — SOL IRR POUR H2O 250ML

## (undated) DEVICE — SOL IRR POUR NS 0.9% 500ML

## (undated) DEVICE — LONE STAR ELASTIC STAY HOOK 5MM SHARP

## (undated) DEVICE — SUT BIOSYN 2-0 27" V-20

## (undated) DEVICE — XI ARM GRASPER TIP UP FENESTRATED

## (undated) DEVICE — SOL INJ NS 0.9% 100ML

## (undated) DEVICE — D HELP - CLEARVIEW CLEARIFY SYSTEM

## (undated) DEVICE — ELCTR BOVIE PENCIL SMOKE EVACUATION

## (undated) DEVICE — TUBING SUCTION 20FT

## (undated) DEVICE — PACK GYN LAPAROSCOPY

## (undated) DEVICE — SHEARS COVIDIEN ENDO SHEAR 5MM X 31CM W UNIPOLAR CAUTERY

## (undated) DEVICE — XI ARM NEEDLE DRIVER SUTURECUT MEGA 8MM

## (undated) DEVICE — XI ARM NEEDLE DRIVER LARGE

## (undated) DEVICE — VISITEC 4X4

## (undated) DEVICE — SUT POLYSORB 0 30" GS-23

## (undated) DEVICE — POSITIONER PURPLE ARM ONE STEP (LARGE)

## (undated) DEVICE — SUT POLYSORB 2-0 30" V-20 UNDYED

## (undated) DEVICE — DRAPE TOWEL BLUE 17" X 24"

## (undated) DEVICE — LAP PAD 18 X 18"

## (undated) DEVICE — PREP BETADINE KIT

## (undated) DEVICE — GLV 6.5 PROTEXIS (WHITE)

## (undated) DEVICE — POSITIONER FOAM EGG CRATE ULNAR 2PCS (PINK)